# Patient Record
Sex: FEMALE | Race: BLACK OR AFRICAN AMERICAN | Employment: FULL TIME | URBAN - METROPOLITAN AREA
[De-identification: names, ages, dates, MRNs, and addresses within clinical notes are randomized per-mention and may not be internally consistent; named-entity substitution may affect disease eponyms.]

---

## 2024-01-20 ENCOUNTER — HOSPITAL ENCOUNTER (EMERGENCY)
Facility: HOSPITAL | Age: 34
Discharge: HOME | End: 2024-01-20
Payer: COMMERCIAL

## 2024-01-20 ENCOUNTER — APPOINTMENT (OUTPATIENT)
Facility: HOSPITAL | Age: 34
End: 2024-01-20
Payer: COMMERCIAL

## 2024-01-20 VITALS
RESPIRATION RATE: 18 BRPM | BODY MASS INDEX: 29.64 KG/M2 | HEIGHT: 61 IN | DIASTOLIC BLOOD PRESSURE: 75 MMHG | WEIGHT: 157 LBS | HEART RATE: 85 BPM | OXYGEN SATURATION: 99 % | TEMPERATURE: 98.1 F | SYSTOLIC BLOOD PRESSURE: 124 MMHG

## 2024-01-20 DIAGNOSIS — M25.562 ACUTE PAIN OF LEFT KNEE: Primary | ICD-10-CM

## 2024-01-20 LAB — HCG UR QL: NEGATIVE

## 2024-01-20 PROCEDURE — 73564 X-RAY EXAM KNEE 4 OR MORE: CPT

## 2024-01-20 PROCEDURE — 6370000000 HC RX 637 (ALT 250 FOR IP): Performed by: NURSE PRACTITIONER

## 2024-01-20 PROCEDURE — 81025 URINE PREGNANCY TEST: CPT

## 2024-01-20 PROCEDURE — 99283 EMERGENCY DEPT VISIT LOW MDM: CPT

## 2024-01-20 RX ORDER — IBUPROFEN 200 MG
600 TABLET ORAL
Status: COMPLETED | OUTPATIENT
Start: 2024-01-20 | End: 2024-01-20

## 2024-01-20 RX ORDER — IBUPROFEN 600 MG/1
600 TABLET ORAL 3 TIMES DAILY PRN
Qty: 30 TABLET | Refills: 0 | Status: SHIPPED | OUTPATIENT
Start: 2024-01-20

## 2024-01-20 RX ORDER — ACETAMINOPHEN 500 MG
1000 TABLET ORAL
Status: COMPLETED | OUTPATIENT
Start: 2024-01-20 | End: 2024-01-20

## 2024-01-20 RX ADMIN — IBUPROFEN 600 MG: 200 TABLET, FILM COATED ORAL at 16:13

## 2024-01-20 RX ADMIN — ACETAMINOPHEN 1000 MG: 500 TABLET ORAL at 16:13

## 2024-01-20 ASSESSMENT — PAIN SCALES - GENERAL
PAINLEVEL_OUTOF10: 9
PAINLEVEL_OUTOF10: 6
PAINLEVEL_OUTOF10: 9

## 2024-01-20 ASSESSMENT — PAIN DESCRIPTION - LOCATION: LOCATION: KNEE

## 2024-01-20 ASSESSMENT — PAIN DESCRIPTION - ORIENTATION: ORIENTATION: LEFT

## 2024-01-20 ASSESSMENT — PAIN - FUNCTIONAL ASSESSMENT: PAIN_FUNCTIONAL_ASSESSMENT: 0-10

## 2024-01-20 ASSESSMENT — LIFESTYLE VARIABLES
HOW MANY STANDARD DRINKS CONTAINING ALCOHOL DO YOU HAVE ON A TYPICAL DAY: 1 OR 2
HOW OFTEN DO YOU HAVE A DRINK CONTAINING ALCOHOL: MONTHLY OR LESS

## 2024-01-20 NOTE — ED PROVIDER NOTES
[facility] EMERGENCY DEPT  EMERGENCY DEPARTMENT HISTORY AND PHYSICAL EXAM      Date: 1/20/2024  Patient Name: Rosalia Horner  MRN: [id number]  YOB: 1990  Date of evaluation: 1/20/2024  Provider: RAMIRO Reynolds NP   Note Started: 2:49 PM EST 1/20/24    HISTORY OF PRESENT ILLNESS     Chief Complaint   Patient presents with    Knee Pain       History Provided By: Patient    HPI: Rosalia Horner is a 33 y.o. female with no chronic medical history and other history reviewed as listed below presents complaining of left knee pain.  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  She denies any previous injuries to her knee.  She is able to ambulate although painful with a slight limp.  Range of motion is intact although it does aggravate her pain as well.    PAST MEDICAL HISTORY   Past Medical History:  No past medical history on file.    Past Surgical History:  Past Surgical History:   Procedure Laterality Date    DC APPENDECTOMY         Family History:  No family history on file.    Social History:  Social History     Tobacco Use    Smoking status: Never   Substance Use Topics    Alcohol use: Yes    Drug use: No       Allergies:  No Known Allergies    PCP: No, Pcp    Current Meds:   No current facility-administered medications for this encounter.     Current Outpatient Medications   Medication Sig Dispense Refill    ibuprofen (ADVIL;MOTRIN) 600 MG tablet Take 1 tablet by mouth 3 times daily as needed for Pain 30 tablet 0       Social Determinants of Health:   Social Determinants of Health     Tobacco Use: Not on file   Alcohol Use: Not At Risk (1/20/2024)    AUDIT-C     Frequency of Alcohol Consumption: Monthly or less     Average Number of Drinks: 1 or 2      Ultrasound and MRI are read by the radiologist. Plain radiographic images are visualized and preliminarily interpreted by the ED Physician with the following findings: See ED Course Below    Interpretation per the Radiologist below, if available at the time of this note:  XR KNEE LEFT (MIN 4 VIEWS)   Final Result   No acute abnormality.           ED COURSE and DIFFERENTIAL DIAGNOSIS/MDM   CC/HPI Summary, DDx, ED Course, and Reassessment: Patient is a 33 y.o. female presenting for left knee pain. Vitals reveal no significant abnormality and physical exam reveals no significant abnormality with no gross knee instability or neurovascular compromise. Based on the history, physical exam, risk factors, and vitals signs, differential includes: sprain, strain, ligamentous injury, fracture, dislocation, soft tissue contusion. Nerve and vascular damage is unlikely as the patient has a completely benign neurovascular exam. Presentation is very likely a knee sprain.     Will image after ensuring negative hCG level.    Clinical Management Tools:  Not Applicable    Records Reviewed (source and summary of external notes): Prior medical records and Nursing notes    Vitals:    Vitals:    01/20/24 1433   BP: 117/86   Pulse: 94   Resp: 18   Temp: 98.2 °F (36.8 °C)   TempSrc: Oral   SpO2: 100%   Weight: 71.2 kg (157 lb)   Height: 1.549 m (5' 1\")        ED COURSE  ED Course as of 01/20/24 1636   Sat Jan 20, 2024   1538 POC Pregnancy Urine Qual:    Pregnancy, Urine Negative [KR]   1632 XR knee reassuring with no significant abnormality. [KR]      ED Course User Index  [KR] Pauline Davila, APRN - NP     Evaluation is reassuring. Discussed follow up and given resources for follow up as needed. Discussed strict return precautions for neurovascular insufficiency or need for repeat imaging/evaluation if pain not vastly improved in 5-7 days for possible occult fracture.    Understanding was insured that at this time there is no evidence

## 2024-01-24 NOTE — PROGRESS NOTES
quadriceps tendon.  No pain with patella compression or apprehension.  Normal patellar tracking and mobility. Knee ROM 10/0/120 deg.  Knee is stable to varus/valgus stress but there is pain with valgus stress.  Ant/post drawer, Lachman's, David's and Apley's tests negative.   Sensation is intact to light touch in the sural, saphenous, deep peroneal, superficial peroneal, and tibial nerve distributions.  5 out of 5 strength with big toe flexion and extension, ankle dorsiflexion/plantarflexion, knee flexion/extension, hip flexion.  No pain with hip ROM.   Foot is warm and well perfused.      IMAGING:    XR Results (maximum last 3):  Xray Result (most recent):  XR KNEE LEFT (MIN 4 VIEWS) 01/20/2024    Narrative  EXAM: XR KNEE LEFT (MIN 4 VIEWS)    INDICATION: Left knee pain, felt popping sensation.    COMPARISON: None.    FINDINGS: Four views of the left knee demonstrate no fracture or other acute  osseous or articular abnormality. There is no effusion.    Impression  No acute abnormality.      XR CHEST STANDARD TWO VW 11/21/2023    Narrative  Indication: COUGH    Comparison: None    Views: Frontal and lateral views were obtained of the chest.    Findings:    No focal consolidation, pleural effusion, or discernible pneumothorax.    The cardiomediastinal silhouette is normal in size.    The visualized osseous structures are intact.    Impression  No focal consolidation      Signed By: Chet Spicer MD on 11/21/2023 10:38 AM       No orders of the defined types were placed in this encounter.           An electronic signature was used to authenticate this note.  -- Azalea Cameron MD

## 2024-01-29 ENCOUNTER — OFFICE VISIT (OUTPATIENT)
Age: 34
End: 2024-01-29
Payer: COMMERCIAL

## 2024-01-29 VITALS — WEIGHT: 157 LBS | BODY MASS INDEX: 29.64 KG/M2 | HEIGHT: 61 IN

## 2024-01-29 DIAGNOSIS — M23.92 KNEE INTERNAL DERANGEMENT, LEFT: Primary | ICD-10-CM

## 2024-01-29 PROCEDURE — 99203 OFFICE O/P NEW LOW 30 MIN: CPT | Performed by: ORTHOPAEDIC SURGERY

## 2024-01-29 ASSESSMENT — PATIENT HEALTH QUESTIONNAIRE - PHQ9
SUM OF ALL RESPONSES TO PHQ9 QUESTIONS 1 & 2: 0
SUM OF ALL RESPONSES TO PHQ QUESTIONS 1-9: 0
SUM OF ALL RESPONSES TO PHQ QUESTIONS 1-9: 0
2. FEELING DOWN, DEPRESSED OR HOPELESS: 0
1. LITTLE INTEREST OR PLEASURE IN DOING THINGS: 0
SUM OF ALL RESPONSES TO PHQ QUESTIONS 1-9: 0
SUM OF ALL RESPONSES TO PHQ QUESTIONS 1-9: 0

## 2024-01-30 ENCOUNTER — HOSPITAL ENCOUNTER (OUTPATIENT)
Facility: HOSPITAL | Age: 34
Setting detail: RECURRING SERIES
Discharge: HOME | End: 2024-02-02
Payer: COMMERCIAL

## 2024-01-30 PROCEDURE — 97162 PT EVAL MOD COMPLEX 30 MIN: CPT

## 2024-01-30 PROCEDURE — 97110 THERAPEUTIC EXERCISES: CPT

## 2024-01-30 RX ORDER — IBUPROFEN 800 MG/1
800 TABLET ORAL 2 TIMES DAILY PRN
Qty: 60 TABLET | Refills: 0 | Status: SHIPPED | OUTPATIENT
Start: 2024-01-30

## 2024-01-30 NOTE — THERAPY EVALUATION
R         L   Mid-Patella 39cm 40.5cm   6\" below Patella 38.0cm 39.8cm   Figure 8  50.5cm 52.5 cm       Specific joints: *normal values in ()  KNEE                  AROM          PROM                       MMT   R L R L R L   Extension (0)  5 4   5 4+ P!    Flexion (145) 102 74 P!    5 4   Patellar Mobility:  hypermobility and pain with patella mobs   Additional comments: able to perform SLR without extensor lag, weakness reported      HIP     AROM       PROM             MMT   R L R L R L   Flexion (120)     5 4+   Extension (15)     5 5   Abduction (40)     5 5   Adduction (30)     5 5   IR (40)         ER (40)         Additional comments:     ANKLE                               AROM                     PROM                     MMT:   R L R L R L   Dorsiflexion (15)      5 4+   Plantarflexion (50)     5 4+ P!    Inversion (35)      5 4+   Eversion (25)     5 4+   Additional comments:     Mobility Assessment: decreased L knee flexion mobility due to pain      Neurological: Reflexes / Sensations: Intact     Special Tests:   +patella apprehension test   + pain with patella grind test   (-) anterior/posterior drawer test   (-) valgus/varus stress test   Unable to full flex the knee for David test but + pain from knee into thigh with IR/ER of foot going from flexion to extension     Balance: SLS 22 second (pain from knee to ankle), R 30+ seconds         Objective/Functional Outcome Measure: Initial  AM-PAC: 34.46%  AM-PAC score = an established functional score where 0% = no disability       40 min [x]Eval - untimed                        Therapeutic Procedures:  Tx Min Billable or 1:1 Min (if diff from Tx Min) Procedure, Rationale, Specifics   10  [id number] Therapeutic Exercise (timed):  increase ROM, strength, coordination, balance, and proprioception to improve patient's ability to progress to PLOF and address remaining functional goals. (see flow sheet as applicable)    Details if applicable:

## 2024-02-01 ENCOUNTER — APPOINTMENT (OUTPATIENT)
Facility: HOSPITAL | Age: 34
End: 2024-02-01
Payer: COMMERCIAL

## 2024-02-07 ENCOUNTER — TELEPHONE (OUTPATIENT)
Age: 34
End: 2024-02-07

## 2024-02-07 ENCOUNTER — HOSPITAL ENCOUNTER (OUTPATIENT)
Facility: HOSPITAL | Age: 34
Setting detail: RECURRING SERIES
Discharge: HOME | End: 2024-02-10
Payer: COMMERCIAL

## 2024-02-07 PROCEDURE — 97110 THERAPEUTIC EXERCISES: CPT

## 2024-02-07 PROCEDURE — G0283 ELEC STIM OTHER THAN WOUND: HCPCS

## 2024-02-07 PROCEDURE — 97035 APP MDLTY 1+ULTRASOUND EA 15: CPT

## 2024-02-07 NOTE — PROGRESS NOTES
type/lbs:        []  pro      []  sup           []  int       []  cont            []  before manual           []  after manual    8 min [x]  Ultrasound,         settings/location: medial knee  50% duty  3.3 Mhz  0.9 cm2    min  unbilled []  Ice     []  Heat            location/position: L          min []  Vasopneumatic Device,      press/temp:   pre-treatment girth :    post-treatment girth :    measured at (landmark       location) :   If using vaso (only need to measure limb vaso being performed on)        min []  Other:        Skin assessment post-treatment (if applicable):    [x]  intact    []  redness- no adverse reaction                 []redness - adverse reaction:          [x]  Patient Education billed concurrently with other procedures   [x] Review HEP    [] Progressed/Changed HEP, detail:    [] Other detail:         Other Objective/Functional Measures  Initiated POC in clinic     Pain Level at end of session (0-10 scale): 8/10      Assessment   Pt reporting increase in pain post session.  She was able to perform all exercises without significant difficulty.  Noted improvement in knee flexion ROM this session.  Will continue current POC.   Pt will benefit from skilled services to improve performance with joint stabilization and mechanics, knee flexibility and strength, neuromuscular activation and awareness of the LE for joint protection, and to address impairments in order to meet functional goals.       Progress toward goals / Updated goals:  []  See Progress Note/Recertification    Short Term Goals: To be accomplished in 8 treatments.  Pt will be independent with HEP to promote progression of therapy services.      [] Met [] Not met [] Partially met  Date:     Pt will use ice/heat/massage to assist with inflammation and pain management as instructed and no pain > 5/10 on the VAS.      [] Met [] Not met [] Partially met  Date:     Pt will verbalize the reasoning for and use proper sleeping techniques for

## 2024-02-07 NOTE — TELEPHONE ENCOUNTER
Patient has been provided a letter to be out of work until 02/12/24 but her MRI for Lt Knee had to be rescheduled due to insurance authorization for Thursday, 02/08/24. She explained that Dr. Cameron stated she would to ideally see her to go over the MRI results before she returns back to work. The doctor's next appointment for going over the MRI results would be 02/12/24 and she would like to know if she may receive a letter excusing her out until at least 02/13/24. Patient has been scheduled for the appt on 02/12/24 at 8:20 am. The patient may be reached at [phone], she would like to know when the letter is available on [employer].

## 2024-02-08 ENCOUNTER — TELEPHONE (OUTPATIENT)
Age: 34
End: 2024-02-08

## 2024-02-08 ENCOUNTER — HOSPITAL ENCOUNTER (OUTPATIENT)
Facility: HOSPITAL | Age: 34
Discharge: HOME | End: 2024-02-08
Attending: ORTHOPAEDIC SURGERY
Payer: COMMERCIAL

## 2024-02-08 DIAGNOSIS — M23.92 KNEE INTERNAL DERANGEMENT, LEFT: ICD-10-CM

## 2024-02-08 PROCEDURE — 73721 MRI JNT OF LWR EXTRE W/O DYE: CPT

## 2024-02-08 NOTE — TELEPHONE ENCOUNTER
Called pt regarding updated work note and that it was available to  if needed. Pt stated that she received the updated copy

## 2024-02-09 ENCOUNTER — APPOINTMENT (OUTPATIENT)
Facility: HOSPITAL | Age: 34
End: 2024-02-09
Payer: COMMERCIAL

## 2024-02-09 NOTE — PROGRESS NOTES
is here for a follow up visit after left knee twisting injury on 1/18, with suspected MCL, meniscus tears.  She is following up after MRI.  At her last visit on 1/29/24 she was given hinged knee brace and referred to PT.    Patient reports continued pain and sensation of instability.  She did try to transition to a less bulky brace but reports that she experienced more instability with this.  She reports soreness after physical therapy.    Current Outpatient Medications on File Prior to Visit   Medication Sig Dispense Refill    ibuprofen (ADVIL;MOTRIN) 800 MG tablet Take 1 tablet by mouth 2 times daily as needed for Pain 60 tablet 0     No current facility-administered medications on file prior to visit.       ROS:  General: denies agitation, fevers/chills  Cardiac: denies major chest pain  Gastrointestinal: denies nausea/vomiting  Neurologic: Denies numbness/paresthesias  Skin: Denies erythema, warmth to touch, active drainage  Musculoskeletal: Endorses appropriate pain at surgical site      Physical Examination:    Last menstrual period 12/07/2023.    GENERAL: Patient is a healthy-appearing and in no apparent distress. Afebrile, normotensive, regular rate  MENTAL STATUS: Alert and oriented to time, place, person; mood and affect normal.  PULMONARY: Respiratory rate normal and non-labored on room air.  GASTROINTESTINAL: Nondistended abdomen  CARDIAC: Regular rate and rhythm. Without pitting edema of affected extremity and peripheral pulses normal unless otherwise noted.  SKIN: No obvious lacerations or cutaneous disruptions in examined extremity unless otherwise noted.  GAIT: antalgic    FOCUSED MUSCULOSKELETAL SYSTEM:  Left knee:  Skin intact. No effusion.  Tenderness to palpation over the medial joint line, MCL attachment on the tibia, Pez anserine.  No tenderness to palpation over lateral joint line, posterior knee, distal femur, patella or quadriceps tendon.  No pain with patella compression or

## 2024-02-12 ENCOUNTER — OFFICE VISIT (OUTPATIENT)
Age: 34
End: 2024-02-12
Payer: COMMERCIAL

## 2024-02-12 ENCOUNTER — APPOINTMENT (OUTPATIENT)
Facility: HOSPITAL | Age: 34
End: 2024-02-12
Payer: COMMERCIAL

## 2024-02-12 VITALS — WEIGHT: 157 LBS | BODY MASS INDEX: 29.64 KG/M2 | HEIGHT: 61 IN

## 2024-02-12 DIAGNOSIS — M70.50 PES ANSERINE BURSITIS: ICD-10-CM

## 2024-02-12 DIAGNOSIS — S83.412D SPRAIN OF MEDIAL COLLATERAL LIGAMENT OF LEFT KNEE, SUBSEQUENT ENCOUNTER: Primary | ICD-10-CM

## 2024-02-12 PROCEDURE — 99213 OFFICE O/P EST LOW 20 MIN: CPT | Performed by: ORTHOPAEDIC SURGERY

## 2024-02-14 ENCOUNTER — APPOINTMENT (OUTPATIENT)
Facility: HOSPITAL | Age: 34
End: 2024-02-14
Payer: COMMERCIAL

## 2024-03-06 ENCOUNTER — TELEPHONE (OUTPATIENT)
Age: 34
End: 2024-03-06

## 2024-03-15 ENCOUNTER — HOSPITAL ENCOUNTER (EMERGENCY)
Facility: HOSPITAL | Age: 34
Discharge: HOME | End: 2024-03-15
Attending: STUDENT IN AN ORGANIZED HEALTH CARE EDUCATION/TRAINING PROGRAM
Payer: COMMERCIAL

## 2024-03-15 VITALS
WEIGHT: 147 LBS | BODY MASS INDEX: 27.75 KG/M2 | DIASTOLIC BLOOD PRESSURE: 67 MMHG | RESPIRATION RATE: 16 BRPM | HEART RATE: 77 BPM | TEMPERATURE: 98.2 F | SYSTOLIC BLOOD PRESSURE: 103 MMHG | OXYGEN SATURATION: 98 % | HEIGHT: 61 IN

## 2024-03-15 DIAGNOSIS — G43.919 INTRACTABLE MIGRAINE WITHOUT STATUS MIGRAINOSUS, UNSPECIFIED MIGRAINE TYPE: Primary | ICD-10-CM

## 2024-03-15 PROCEDURE — 6360000002 HC RX W HCPCS: Performed by: STUDENT IN AN ORGANIZED HEALTH CARE EDUCATION/TRAINING PROGRAM

## 2024-03-15 PROCEDURE — 99284 EMERGENCY DEPT VISIT MOD MDM: CPT

## 2024-03-15 PROCEDURE — 6370000000 HC RX 637 (ALT 250 FOR IP): Performed by: STUDENT IN AN ORGANIZED HEALTH CARE EDUCATION/TRAINING PROGRAM

## 2024-03-15 RX ORDER — METOCLOPRAMIDE 10 MG/1
10 TABLET ORAL 3 TIMES DAILY PRN
Qty: 20 TABLET | Refills: 0 | Status: SHIPPED | OUTPATIENT
Start: 2024-03-15

## 2024-03-15 RX ORDER — DIPHENHYDRAMINE HCL 25 MG
25 CAPSULE ORAL
Status: COMPLETED | OUTPATIENT
Start: 2024-03-15 | End: 2024-03-15

## 2024-03-15 RX ORDER — DEXAMETHASONE SODIUM PHOSPHATE 10 MG/ML
6 INJECTION, SOLUTION INTRAMUSCULAR; INTRAVENOUS ONCE
Status: COMPLETED | OUTPATIENT
Start: 2024-03-15 | End: 2024-03-15

## 2024-03-15 RX ORDER — METOCLOPRAMIDE 10 MG/1
10 TABLET ORAL ONCE
Status: COMPLETED | OUTPATIENT
Start: 2024-03-15 | End: 2024-03-15

## 2024-03-15 RX ORDER — IBUPROFEN 600 MG/1
600 TABLET ORAL EVERY 8 HOURS PRN
Qty: 20 TABLET | Refills: 0 | Status: SHIPPED | OUTPATIENT
Start: 2024-03-15

## 2024-03-15 RX ORDER — IBUPROFEN 600 MG/1
600 TABLET ORAL
Status: COMPLETED | OUTPATIENT
Start: 2024-03-15 | End: 2024-03-15

## 2024-03-15 RX ORDER — ACETAMINOPHEN 500 MG
1000 TABLET ORAL ONCE
Status: COMPLETED | OUTPATIENT
Start: 2024-03-15 | End: 2024-03-15

## 2024-03-15 RX ADMIN — ACETAMINOPHEN 1000 MG: 500 TABLET ORAL at 04:41

## 2024-03-15 RX ADMIN — METOCLOPRAMIDE 10 MG: 10 TABLET ORAL at 04:41

## 2024-03-15 RX ADMIN — DEXAMETHASONE SODIUM PHOSPHATE 6 MG: 10 INJECTION INTRAMUSCULAR; INTRAVENOUS at 04:41

## 2024-03-15 RX ADMIN — DIPHENHYDRAMINE HYDROCHLORIDE 25 MG: 25 CAPSULE ORAL at 04:41

## 2024-03-15 RX ADMIN — IBUPROFEN 600 MG: 600 TABLET, FILM COATED ORAL at 04:41

## 2024-03-15 ASSESSMENT — LIFESTYLE VARIABLES
HOW OFTEN DO YOU HAVE A DRINK CONTAINING ALCOHOL: 2-4 TIMES A MONTH
HOW MANY STANDARD DRINKS CONTAINING ALCOHOL DO YOU HAVE ON A TYPICAL DAY: 1 OR 2

## 2024-03-15 ASSESSMENT — PAIN SCALES - GENERAL
PAINLEVEL_OUTOF10: 3
PAINLEVEL_OUTOF10: 8
PAINLEVEL_OUTOF10: 7

## 2024-03-15 ASSESSMENT — PAIN - FUNCTIONAL ASSESSMENT: PAIN_FUNCTIONAL_ASSESSMENT: 0-10

## 2024-03-15 ASSESSMENT — PAIN DESCRIPTION - LOCATION: LOCATION: HEAD

## 2024-03-15 NOTE — ED TRIAGE NOTES
Migraine over left eye, lightheadedness, dizziness, blurred vision started yesterday. Denies n/v.

## 2024-03-15 NOTE — DISCHARGE INSTRUCTIONS
effects or interactions with other medications, please call your primary care physician or contact the ER to speak with the charge nurse.     Make an appointment with your family doctor or the physician you were referred to for follow-up of this visit as instructed on your discharge paperwork, as this is a mandatory follow-up. Return to the ER if you are unable to be seen or if you are unable to be seen in a timely manner.    If you have any problem arranging the follow-up visit, contact the Emergency Department immediately.

## 2024-03-20 DIAGNOSIS — M23.92 KNEE INTERNAL DERANGEMENT, LEFT: Primary | ICD-10-CM

## 2024-03-20 RX ORDER — IBUPROFEN 800 MG/1
800 TABLET ORAL 2 TIMES DAILY
Qty: 60 TABLET | Refills: 0 | Status: SHIPPED | OUTPATIENT
Start: 2024-03-20

## 2024-04-29 NOTE — ED TRIAGE NOTES
Pt c/o L knee pain. Pt reports today at work she felt the knee pop and a sharp pain radiated up to her thigh. Denies any injury to the knee.   
no